# Patient Record
Sex: MALE | Race: WHITE | NOT HISPANIC OR LATINO | URBAN - METROPOLITAN AREA
[De-identification: names, ages, dates, MRNs, and addresses within clinical notes are randomized per-mention and may not be internally consistent; named-entity substitution may affect disease eponyms.]

---

## 2019-08-03 ENCOUNTER — EMERGENCY (EMERGENCY)
Facility: HOSPITAL | Age: 34
LOS: 0 days | Discharge: HOME | End: 2019-08-03
Payer: MEDICAID

## 2019-08-03 ENCOUNTER — EMERGENCY (EMERGENCY)
Facility: HOSPITAL | Age: 34
LOS: 0 days | Discharge: HOME | End: 2019-08-03
Admitting: EMERGENCY MEDICINE
Payer: MEDICAID

## 2019-08-03 VITALS
SYSTOLIC BLOOD PRESSURE: 137 MMHG | DIASTOLIC BLOOD PRESSURE: 83 MMHG | OXYGEN SATURATION: 100 % | HEART RATE: 63 BPM | RESPIRATION RATE: 20 BRPM | TEMPERATURE: 99 F
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
## 2019-08-03 DIAGNOSIS — S63.502A UNSPECIFIED SPRAIN OF LEFT WRIST, INITIAL ENCOUNTER: ICD-10-CM

## 2019-08-03 DIAGNOSIS — M25.532 PAIN IN LEFT WRIST: ICD-10-CM
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
## 2019-08-03 DIAGNOSIS — Z02.9 ENCOUNTER FOR ADMINISTRATIVE EXAMINATIONS, UNSPECIFIED: ICD-10-CM
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
## 2019-08-03 PROCEDURE — 29125 APPL SHORT ARM SPLINT STATIC: CPT

## 2019-08-03 PROCEDURE — 73130 X-RAY EXAM OF HAND: CPT | Mod: 26,LT

## 2019-08-03 PROCEDURE — 73110 X-RAY EXAM OF WRIST: CPT | Mod: 26,LT

## 2019-08-03 PROCEDURE — 99283 EMERGENCY DEPT VISIT LOW MDM: CPT | Mod: 25

## 2019-08-03 PROCEDURE — 73090 X-RAY EXAM OF FOREARM: CPT | Mod: 26,LT

## 2019-08-03 RX ORDER — IBUPROFEN 200 MG
800 TABLET ORAL ONCE
Refills: 0 | Status: COMPLETED | OUTPATIENT
Start: 2019-08-03 | End: 2019-08-03

## 2019-08-03 RX ADMIN — Medication 800 MILLIGRAM(S): at 02:04

## 2019-08-03 NOTE — ED ADULT NURSE NOTE - NSIMPLEMENTINTERV_GEN_ALL_ED
Implemented All Universal Safety Interventions:  [place] to call system. Call bell, personal items and telephone within reach. Instruct patient to call for assistance. Room bathroom lighting operational. Non-slip footwear when patient is off stretcher. Physically safe environment: no spills, clutter or unnecessary equipment. Stretcher in lowest position, wheels locked, appropriate side rails in place.

## 2019-08-03 NOTE — ED PROVIDER NOTE - CARE PROVIDER_API CALL
Saad Pineda)  Orthopaedic Surgery  [address] [place] [address]  Phone: [phone]  Fax: [phone]  Follow Up Time: 1-3 Days

## 2019-08-03 NOTE — ED PROVIDER NOTE - NSFOLLOWUPCLINICS_GEN_ALL_ED_FT
[facility]  Orthpedic  [address], [place]   Phone: [phone]  Fax:   Follow Up Time: 1-3 Days

## 2019-08-03 NOTE — ED PROVIDER NOTE - PHYSICAL EXAMINATION
VITAL SIGNS: I have reviewed nursing notes and confirm.  CONSTITUTIONAL: Well-developed; well-nourished; in no acute distress.  SKIN: Skin exam is warm and dry, no acute rash.  HEAD: Normocephalic; atraumatic.  EXT: +Painful ROM of left wrist. +Swelling to left wrist. +Tenderness to left wrist. No snuff box tenderness.   NEURO: Alert, oriented. Grossly unremarkable. No focal deficits.  PSYCH: Cooperative, appropriate.

## 2019-08-03 NOTE — ED PROVIDER NOTE - CLINICAL SUMMARY MEDICAL DECISION MAKING FREE TEXT BOX
Pt with mechanical fall now with left wrist pain and swelling. Xrays neg. No snuffbox tenderness. Volar splint applied. Given ortho fu. I have discussed the discharge plan with the patient. The patient agrees with the plan, as discussed.  The patient understands Emergency Department diagnosis is a preliminary diagnosis often based on limited information and that the patient must adhere to the follow-up plan as discussed.  The patient understands that if the symptoms worsen or if prescribed medications do not have the desired/planned effect that the patient may return to the Emergency Department at any time for further evaluation and treatment.

## 2019-08-03 NOTE — ED PROVIDER NOTE - OBJECTIVE STATEMENT
33 yo M with no pmhx presenting with left wrist pain after falling onto left wrist today. Reports swelling to left wrist. Pain is worse with palpation and movement. No numbness, tingling, or weakness. Reports painful ROM.

## 2019-08-03 NOTE — ED PROVIDER NOTE - NS ED ROS FT
Review of Systems:  	•	CONSTITUTIONAL - no fever, no diaphoresis, no chills  	•	SKIN - no rash  	•	HEMATOLOGIC - no bleeding, no bruising  	•	MUSCULOSKELETAL - +wrist pain, + swelling, no redness  	•	NEUROLOGIC - no weakness, no paresthesias  	All other ROS are negative except as documented in HPI.

## 2019-08-05 NOTE — ED PROCEDURE NOTE - CPROC ED POST RADIOGRAPHY1
extremity correctly positioned, splinted/post-procedure radiography performed/post procedure radiography not performed extremity correctly positioned, splinted

## 2022-09-20 NOTE — ED PROVIDER NOTE - NSFOLLOWUPINSTRUCTIONS_ED_ALL_ED_FT
Wrist Sprain, Adult  A wrist sprain is a stretch or tear in the strong, fibrous tissues (ligaments) that connect your wrist bones. There are three types of wrist sprains:  Grade 1. In this type of sprain, the ligament is stretched more than normal.  Grade 2. In this type of sprain, the ligament is partially torn. You may be able to move your wrist, but not very much.  Grade 3. In this type of sprain, the ligament or muscle is completely torn. You may find it difficult or extremely painful to move your wrist even a little.  What are the causes?  A wrist sprain can be caused by using the wrist too much during sports, exercise, or at work. It can also happen with a fall or during an accident.    What increases the risk?  This condition is more likely to occur in people:  With a previous wrist or arm injury.  With poor wrist strength and flexibility.  Who play contact sports, such as football or soccer.  Who play sports that may result in a fall, such as skateboarding, biking, skiing, or snowboarding.  Who do not exercise regularly.  Who use exercise equipment that does not fit well.  What are the signs or symptoms?  Symptoms of this condition include:  Pain in the wrist, arm, or hand.  Swelling or bruised skin near the wrist, hand, or arm. The skin may look yellow or kind of blue.  Stiffness or trouble moving the hand.  Hearing a pop or feeling a tear at the time of the injury.  A warm feeling in the skin around the wrist.  How is this diagnosed?  This condition is diagnosed with a physical exam. Sometimes an X-ray is taken to make sure a bone did not break. If your health care provider thinks that you tore a ligament, he or she may order an MRI of your wrist.    How is this treated?  This condition is treated by resting and applying ice to your wrist. Additional treatment may include:  Medicine for pain and inflammation.  A splint to keep your wrist still (immobilized).  Exercises to strengthen and stretch your wrist.  Surgery. This may be done if the ligament is completely torn.  Follow these instructions at home:  If you have a splint:     Image   Do not put pressure on any part of the splint until it is fully hardened. This may take several hours.  Wear the splint as told by your health care provider. Remove it only as told by your health care provider.  Loosen the splint if your fingers tingle, become numb, or turn cold and blue.  If your splint is not waterproof:  Do not let it get wet.  Cover it with a watertight covering when you take a bath or a shower.  Keep the splint clean.  Managing pain, stiffness, and swelling     Image   If directed, put ice on the injured area.  If you have a removable splint, remove it as told by your health care provider.  Put ice in a plastic bag.  Place a towel between your skin and the bag or between the splint and the bag.  Leave the ice on for 20 minutes, 2–3 times per day.  Move your fingers often to avoid stiffness and to lessen swelling.  Raise (elevate) the injured area above the level of your heart while you are sitting or lying down.  Activity     Rest your wrist. Do not do things that cause pain.  Return to your normal activities as told by your health care provider. Ask your health care provider what activities are safe for you.  Do exercises as told by your health care provider.  General instructions     Take over-the-counter and prescription medicines only as told by your health care provider.  Do not use any products that contain nicotine or tobacco, such as cigarettes and e-cigarettes. These can delay healing. If you need help quitting, ask your health care provider.  Ask your health care provider when it is safe to drive if you have a splint.  Keep all follow-up visits as told by your health care provider. This is important.  Contact a health care provider if:  Your pain, bruising, or swelling gets worse.  Your skin becomes red, gets a rash, or has open sores.  Your pain does not get better or it gets worse.  Get help right away if:  You have a new or sudden sharp pain in the hand, arm, or wrist.  You have tingling or numbness in your hand.  Your fingers turn white, very red, or cold and blue.  You cannot move your fingers.  This information is not intended to replace advice given to you by your health care provider. Make sure you discuss any questions you have with your health care provider. Wrist Sprain, Adult    A wrist sprain is a stretch or tear in the strong, fibrous tissues (ligaments) that connect your wrist bones. There are three types of wrist sprains:  Grade 1. In this type of sprain, the ligament is stretched more than normal.  Grade 2. In this type of sprain, the ligament is partially torn. You may be able to move your wrist, but not very much.  Grade 3. In this type of sprain, the ligament or muscle is completely torn. You may find it difficult or extremely painful to move your wrist even a little.  What are the causes?  A wrist sprain can be caused by using the wrist too much during sports, exercise, or at work. It can also happen with a fall or during an accident.    What increases the risk?  This condition is more likely to occur in people:  With a previous wrist or arm injury.  With poor wrist strength and flexibility.  Who play contact sports, such as football or soccer.  Who play sports that may result in a fall, such as skateboarding, biking, skiing, or snowboarding.  Who do not exercise regularly.  Who use exercise equipment that does not fit well.  What are the signs or symptoms?  Symptoms of this condition include:  Pain in the wrist, arm, or hand.  Swelling or bruised skin near the wrist, hand, or arm. The skin may look yellow or kind of blue.  Stiffness or trouble moving the hand.  Hearing a pop or feeling a tear at the time of the injury.  A warm feeling in the skin around the wrist.  How is this diagnosed?  This condition is diagnosed with a physical exam. Sometimes an X-ray is taken to make sure a bone did not break. If your health care provider thinks that you tore a ligament, he or she may order an MRI of your wrist.    How is this treated?  This condition is treated by resting and applying ice to your wrist. Additional treatment may include:  Medicine for pain and inflammation.  A splint to keep your wrist still (immobilized).  Exercises to strengthen and stretch your wrist.  Surgery. This may be done if the ligament is completely torn.  Follow these instructions at home:  If you have a splint:     Image   Do not put pressure on any part of the splint until it is fully hardened. This may take several hours.  Wear the splint as told by your health care provider. Remove it only as told by your health care provider.  Loosen the splint if your fingers tingle, become numb, or turn cold and blue.  If your splint is not waterproof:  Do not let it get wet.  Cover it with a watertight covering when you take a bath or a shower.  Keep the splint clean.  Managing pain, stiffness, and swelling     Image   If directed, put ice on the injured area.  If you have a removable splint, remove it as told by your health care provider.  Put ice in a plastic bag.  Place a towel between your skin and the bag or between the splint and the bag.  Leave the ice on for 20 minutes, 2–3 times per day.  Move your fingers often to avoid stiffness and to lessen swelling.  Raise (elevate) the injured area above the level of your heart while you are sitting or lying down.  Activity     Rest your wrist. Do not do things that cause pain.  Return to your normal activities as told by your health care provider. Ask your health care provider what activities are safe for you.  Do exercises as told by your health care provider.  General instructions     Take over-the-counter and prescription medicines only as told by your health care provider.  Do not use any products that contain nicotine or tobacco, such as cigarettes and e-cigarettes. These can delay healing. If you need help quitting, ask your health care provider.  Ask your health care provider when it is safe to drive if you have a splint.  Keep all follow-up visits as told by your health care provider. This is important.  Contact a health care provider if:  Your pain, bruising, or swelling gets worse.  Your skin becomes red, gets a rash, or has open sores.  Your pain does not get better or it gets worse.  Get help right away if:  You have a new or sudden sharp pain in the hand, arm, or wrist.  You have tingling or numbness in your hand.  Your fingers turn white, very red, or cold and blue.  You cannot move your fingers.  This information is not intended to replace advice given to you by your health care provider. Make sure you discuss any questions you have with your health care provider. Cimzia Counseling:  I discussed with the patient the risks of Cimzia including but not limited to immunosuppression, allergic reactions and infections.  The patient understands that monitoring is required including a PPD at baseline and must alert us or the primary physician if symptoms of infection or other concerning signs are noted.
